# Patient Record
(demographics unavailable — no encounter records)

---

## 2024-11-05 NOTE — HISTORY OF PRESENT ILLNESS
[FreeTextEntry1] : Pt has no history of myocardial infarction or congestive heart failure or chest pain syndrome  He has been treated for anxiety since teenage years -. clonazepam since 2003  For years, he's had erratic BPs -. treated for 8 months in 2010 -. had some kind of reaction (knees , elbows, joint) -. sporadic BP meds thereafter  In 3/19, he got really sick -. some type of upper respiratory illness -. loosing wt, kidney dysfunction Saw Dr Gee -. chest pressure, "like I am burning calories", overdrive constantly  -> sent to the ER at [facility] -. echo and a nuclear stress  -. "everything came out good"  Gets sudden elevations in HR, about every couple of weeks, usually when doing something (not strenuous) -. up 120s , "on overdrive"  -> rests and resolves after about 5 minutes  Also, episodes of dizziness with standing and while standing -> ringing in the rear, blacked vision -, no syncope , resolved after about  5 minutes.  He had similar episodes with bending down, working on equipment   Had episode of palpitations with HR 150s in 12/21 -. ER at [facility] -. afib -. dilt -. SR Saw Dr Horowitz (cardiologist at [facility]) -. monitor for 1 month -. no recurrent afib  He always feels chest pressure -. gets worse in response to stressful things like in movies and from family stress  Had dilt yrs ago -> felt horrible -. tried again 1 ya -. same reaction -. still onit

## 2024-11-05 NOTE — HISTORY OF PRESENT ILLNESS
[FreeTextEntry1] : Pt has no history of myocardial infarction or congestive heart failure or chest pain syndrome  He has been treated for anxiety since teenage years -. clonazepam since 2003  For years, he's had erratic BPs -. treated for 8 months in 2010 -. had some kind of reaction (knees , elbows, joint) -. sporadic BP meds thereafter  In 3/19, he got really sick -. some type of upper respiratory illness -. loosing wt, kidney dysfunction Saw Dr Gee -. chest pressure, "like I am burning calories", overdrive constantly  -> sent to the ER at [facility] -. echo and a nuclear stress  -. "everything came out good"  Gets sudden elevations in HR, about every couple of weeks, usually when doing something (not strenuous) -. up 120s , "on overdrive"  -> rests and resolves after about 5 minutes  Also, episodes of dizziness with standing and while standing -> ringing in the rear, blacked vision -, no syncope , resolved after about  5 minutes.  He had similar episodes with bending down, working on equipment   Had episode of palpitations with HR 150s in 12/21 -. ER at [facility] -. afib -. dilt -. SR Saw Dr Horowitz (cardiologist at [facility]) -. monitor for 1 month -. no recurrent afib  He always feels chest pressure -. gets worse in response to stressful things like in movies and from family stress  Had dilt yrs ago -> felt horrible -. tried again 1 ya -. same reaction -. still onit

## 2024-11-05 NOTE — REASON FOR VISIT
[Hypertension] : hypertension [Symptom and Test Evaluation] : symptom and test evaluation [Arrhythmia/ECG Abnorrmalities] : arrhythmia/ECG abnormalities [Coronary Artery Disease] : coronary artery disease [FreeTextEntry3] : Dr Jamshid Gee

## 2024-12-04 NOTE — PHYSICAL EXAM
[FreeTextEntry1] : Physical examination  General: No acute distress, Awake, Alert.  He is very anxious at today's visit.  Mental status  Awake, alert, and oriented to person, time and place, Normal attention span and concentration, Recent and remote memory intact, Language intact, Fund of knowledge intact.  Cranial Nerves  II: VFF  III, IV, VI: PERRL, EOMI.  V: Facial sensation is normal B/L.  VII: Facial strength is normal B/L.  VIII: Gross hearing is intact.  IX, X: Palate is midline and elevates symmetrically.  XI: Trapezius normal strength.  XII: Tongue midline without atrophy or fasciculations.   Motor exam  Muscle tone - no evidence of rigidity or resistance in all 4 extremities.  No atrophy or fasciculations  Muscle Strength: arms and legs, proximal and distal flexors and extensors are normal  No UE drift.  Reflexes  All present, normal, and symmetrical.  Plantars right: mute.  Plantars left: mute.   Coordination  Finger to nose: Normal.  Heel to shin: Normal.   Gait  Normal

## 2024-12-04 NOTE — HISTORY OF PRESENT ILLNESS
[FreeTextEntry1] : This is a 67-year-old right-handed man who is being seen in neurologic consultation for evaluation of a diffuse head pressure.  He has intermittent leg weakness and dizziness when this occurs.  At other times of dizziness can occur independent of the head pressure.  He describes pressure in his jaw and face as well.  When he is stressed out he notes a lot of sweating in his hands.  This issue has been present for a couple of years and is only getting worse. The head pressure or headache is more occipital and location.  There is no nausea or vomiting or light or sound sensitivity.  There is a sensation of dizziness by which he means problems with balance.  He has a long history of anxiety.  He was diagnosed and treated since the age of 18.  At that time he was taking some medication for it.  In 2003, he was placed on Zoloft.  He took this for 4 to 5 years and for unknown reason stopped taking it.  At that time he was given clonazepam.  Over the last 6 months, he is taking 0.75 mg of clonazepam daily.  In the past he used to only take it a couple times a week.  He notes he is not as mentally sharp.  He describes a lot of problems with brain fog.  He has had COVID twice.  He notes that when he is feverish all of his symptoms including anxiety get better.  He dates the onset of the other problems above after 2019.  He had some sort of a viral infection.  After this he developed a lot of problems.  MRI of the brain and cardiac workup at that time were all normal.  He describes constantly feeling anxious.  He describes a constant pressure in his chest.  He often wakes up feeling like this.  He really just does not want to feel anxious and stressed out anymore.  He was given a prescription for sertraline by Dr. Gee but never started it.  He is not sure why. His blood pressure has been very difficult to manage.  It is elevated today in the office.  He did not take diltiazem this morning. He has chronic kidney disease stage III with a creatinine of 1.7 at baseline.  Blood work for metanephrines were normal.

## 2024-12-04 NOTE — CONSULT LETTER
[Dear  ___] : Dear  [unfilled], [Consult Letter:] : I had the pleasure of evaluating your patient, [unfilled]. [Please see my note below.] : Please see my note below. [FreeTextEntry3] : Sincerely,  Michele Mead M.D.

## 2024-12-04 NOTE — ASSESSMENT
[FreeTextEntry1] : I will obtain imaging as outlined below.  The MRI of the brain will be without contrast due to his kidney problems.  His exam is reassuring.  His blood pressure was elevated and this type of reading is not unusual for him.  I advised him to take his diltiazem and recheck the pressure again.  He did not want to go to the emergency room.  I suspect a lot of his issues are due to untreated chronic anxiety.  Because of the elevation of blood pressure, he is also developed kidney problems.  I explained to the patient that in order for us to optimally also control his blood pressure he needs to also control the anxiety.  He will start sertraline 50 mg for 2 weeks, this will be increased to 75 mg for 2 weeks, and final dose will be 100 mg daily.  I will assess if he needs more than this.  It is my opinion that his chronic dependence on clonazepam is also causing him to have brain fog and cognitive complaints.  Over time, he needs to rely less on a benzodiazepine and more on medications to treat anxiety chronically. He is aware that it will take at least 6 to 8 weeks for the medication to start working optimally.

## 2024-12-19 NOTE — HISTORY OF PRESENT ILLNESS
[FreeTextEntry1] : Different brand of clonazepam for a couple of months -. chest  and head pressure  Not sleeping well Nauseated   Patient was given zoloft by Dr Mead -. didn't tolerate it -> feels it was likely responsible for the following  Patient went to [facility] 12/12/24 with right facial numbness -. code stroke -. admitted overnight -. R/o CVA Seen by Dr Elaine -. d/c'ed the water pill, increased his clonazepam, started duloxetine 30 -. increased nausea, constipation, blurry vision           12/13/24 -> LV systolic function is normal, EF 68 %. Mild LVH. Basal septal hypertrophy without evidence of LVOT obstruction. Normal diastolic function. Normal RV. Trace MR/TR. Estimated PASP nl. The appears to be a few late bubbles in the left heart after injection of agitated saline suggestive of insignificant transpulmonic shunting. Compared to 10/31/23 findings are similar ( bubble study not done ).

## 2025-03-06 NOTE — PHYSICAL EXAM
[General Appearance - Alert] : alert [General Appearance - In No Acute Distress] : in no acute distress [Sclera] : the sclera and conjunctiva were normal [Extraocular Movements] : extraocular movements were intact [Outer Ear] : the ears and nose were normal in appearance [Hearing Threshold Finger Rub Not [place]] : hearing was normal [Neck Appearance] : the appearance of the neck was normal [Apical Impulse] : the apical impulse was normal [Heart Sounds] : normal S1 and S2 [Edema] : there was no peripheral edema [Veins - Varicosity Changes] : there were no varicosital changes [Abnormal Walk] : normal gait [Musculoskeletal - Swelling] : no joint swelling seen [Skin Color & Pigmentation] : normal skin color and pigmentation [] : no rash [Oriented To Time, Place, And Person] : oriented to person, place, and time [Affect] : the affect was normal

## 2025-03-06 NOTE — ASSESSMENT
[FreeTextEntry1] : HTN - multifactorial,  - suspect ADD - also extreme salt sensitivity - ate chicken soup , added salt, BP shot up from 150's --> 200's - rec neuro   rec weening off/down clonazepam - admonished to adhere to low salt diet ( no takeout, processed foods, deli meets)  htn - cont  chlorthalidone 25mg,  on low salt ( 2gm sodium diet) - no NSAIDs  check BMET, renin, tana, metanephrine reviewed renal US reviewed PCP note   return n a week for BP check

## 2025-03-06 NOTE — HISTORY OF PRESENT ILLNESS
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  cycles 60 miles/week - hasn't in several a years  stated HCTZ was the only med that worked, but stopped  because he though it might injure his kidneys  cr 1.3 2012, 1.6 --> 2.0 while hospitalized in 12/2024 was 2.0  3/2025 - f/u HTN - went to wrong office, rushed to get here, adrenalin - BP normal - review shows that he was always drawn to activities that are associated with danger ( motorcycles...)